# Patient Record
Sex: FEMALE | Race: WHITE
[De-identification: names, ages, dates, MRNs, and addresses within clinical notes are randomized per-mention and may not be internally consistent; named-entity substitution may affect disease eponyms.]

---

## 2017-05-08 ENCOUNTER — HOSPITAL ENCOUNTER (OUTPATIENT)
Dept: HOSPITAL 39 - GMAJ | Age: 77
Discharge: HOME | End: 2017-05-08
Attending: FAMILY MEDICINE
Payer: MEDICARE

## 2017-05-08 DIAGNOSIS — I10: Primary | ICD-10-CM

## 2017-11-21 ENCOUNTER — HOSPITAL ENCOUNTER (OUTPATIENT)
Dept: HOSPITAL 39 - GMAJ | Age: 77
Discharge: HOME | End: 2017-11-21
Attending: FAMILY MEDICINE
Payer: MEDICARE

## 2017-11-21 DIAGNOSIS — I10: Primary | ICD-10-CM

## 2018-06-21 ENCOUNTER — HOSPITAL ENCOUNTER (OUTPATIENT)
Dept: HOSPITAL 39 - GMAJ | Age: 78
End: 2018-06-21
Attending: FAMILY MEDICINE
Payer: MEDICARE

## 2018-06-21 DIAGNOSIS — I10: Primary | ICD-10-CM

## 2018-06-21 DIAGNOSIS — E53.8: ICD-10-CM

## 2018-06-21 DIAGNOSIS — D50.8: ICD-10-CM

## 2018-07-11 ENCOUNTER — HOSPITAL ENCOUNTER (OUTPATIENT)
Dept: HOSPITAL 39 - US | Age: 78
End: 2018-07-11
Attending: FAMILY MEDICINE
Payer: MEDICARE

## 2018-07-11 DIAGNOSIS — I70.213: Primary | ICD-10-CM

## 2018-07-11 NOTE — US
EXAM DESCRIPTION:

Extremity,Lower Gus Arteries



CLINICAL HISTORY:

77 years Female, PVD WITH CLAUDICATION



COMPARISON:

None.



TECHNIQUE:

2-D color flow and Doppler sonography



FINDINGS:



Right:



Grayscale imaging demonstrates no significant atherosclerotic

plaque..  Waveforms are multiphasic throughout. 



Peak systolic velocities (cm/sec) are as follows:



CFA:  89



Proximal SFA:  112



Mid SFA:  103



Distal SFA:  87



Popliteal:  65



ELIZABETH:  Not measured



PTA:  44



Dorsalis pedis:  71





Left:



Grayscale imaging demonstrates no significant atherosclerotic

plaque.   Waveforms are multiphasic throughout  



Peak systolic velocities (cm/sec) are as follows:



CFA:  73



Proximal SFA:  90



Mid SFA:  97



Distal SFA:  84



Popliteal:  70



ELIZABETH:  Not measured



PTA:  60



Dorsalis pedis:  90





IMPRESSION:



No hemodynamically significant stenosis.



Electronically signed by:  Joaquin Benavides MD  7/11/2018 4:29 PM

CD[facility] Workstation: [id number]

## 2018-08-01 ENCOUNTER — HOSPITAL ENCOUNTER (OUTPATIENT)
Dept: HOSPITAL 39 - GMAJ | Age: 78
End: 2018-08-01
Attending: FAMILY MEDICINE
Payer: MEDICARE

## 2018-08-01 DIAGNOSIS — D50.8: Primary | ICD-10-CM

## 2018-09-11 ENCOUNTER — HOSPITAL ENCOUNTER (OUTPATIENT)
Dept: HOSPITAL 39 - GMAJ | Age: 78
End: 2018-09-11
Attending: FAMILY MEDICINE
Payer: MEDICARE

## 2018-09-11 DIAGNOSIS — D51.9: Primary | ICD-10-CM

## 2018-09-11 DIAGNOSIS — I10: ICD-10-CM

## 2018-09-11 DIAGNOSIS — M17.0: ICD-10-CM

## 2018-09-11 DIAGNOSIS — J45.20: ICD-10-CM
